# Patient Record
Sex: FEMALE | Race: WHITE | HISPANIC OR LATINO | Employment: UNEMPLOYED | URBAN - METROPOLITAN AREA
[De-identification: names, ages, dates, MRNs, and addresses within clinical notes are randomized per-mention and may not be internally consistent; named-entity substitution may affect disease eponyms.]

---

## 2017-01-25 ENCOUNTER — HOSPITAL ENCOUNTER (EMERGENCY)
Age: 37
Discharge: HOME | End: 2017-01-26
Attending: EMERGENCY MEDICINE
Payer: COMMERCIAL

## 2017-01-25 DIAGNOSIS — R00.0 TACHYCARDIA: Primary | ICD-10-CM

## 2017-01-25 PROCEDURE — 99285 EMERGENCY DEPT VISIT HI MDM: CPT | Performed by: EMERGENCY MEDICINE

## 2017-01-25 RX ORDER — SODIUM CHLORIDE 0.9 % (FLUSH) 0.9 %
5-10 SYRINGE (ML) INJECTION EVERY 8 HOURS
Status: DISCONTINUED | OUTPATIENT
Start: 2017-01-25 | End: 2017-01-26 | Stop reason: HOSPADM

## 2017-01-25 RX ORDER — SODIUM CHLORIDE 0.9 % (FLUSH) 0.9 %
5-10 SYRINGE (ML) INJECTION AS NEEDED
Status: DISCONTINUED | OUTPATIENT
Start: 2017-01-25 | End: 2017-01-26 | Stop reason: HOSPADM

## 2017-01-26 VITALS
RESPIRATION RATE: 18 BRPM | HEART RATE: 102 BPM | SYSTOLIC BLOOD PRESSURE: 129 MMHG | OXYGEN SATURATION: 99 % | DIASTOLIC BLOOD PRESSURE: 73 MMHG | TEMPERATURE: 98.1 F | WEIGHT: 116.84 LBS

## 2017-01-26 LAB
ANION GAP BLD CALC-SCNC: 7 MMOL/L (ref 7–16)
ATRIAL RATE: 100 BPM
BASOPHILS # BLD AUTO: 0 K/UL (ref 0–0.2)
BASOPHILS # BLD: 0 % (ref 0–2)
BUN SERPL-MCNC: 10 MG/DL (ref 6–23)
CALCIUM SERPL-MCNC: 8.3 MG/DL (ref 8.3–10.4)
CALCULATED P AXIS, ECG09: 76 DEGREES
CALCULATED R AXIS, ECG10: 67 DEGREES
CALCULATED T AXIS, ECG11: 68 DEGREES
CHLORIDE SERPL-SCNC: 105 MMOL/L (ref 98–107)
CO2 SERPL-SCNC: 27 MMOL/L (ref 21–32)
CREAT SERPL-MCNC: 0.65 MG/DL (ref 0.6–1)
DIAGNOSIS, 93000: NORMAL
DIASTOLIC BP, ECG02: NORMAL MMHG
DIFFERENTIAL METHOD BLD: NORMAL
EOSINOPHIL # BLD: 0.1 K/UL (ref 0–0.8)
EOSINOPHIL NFR BLD: 2 % (ref 0.5–7.8)
ERYTHROCYTE [DISTWIDTH] IN BLOOD BY AUTOMATED COUNT: 12.7 % (ref 11.9–14.6)
GLUCOSE SERPL-MCNC: 118 MG/DL (ref 65–100)
HCG UR QL: NEGATIVE
HCT VFR BLD AUTO: 39.3 % (ref 35.8–46.3)
HGB BLD-MCNC: 13.3 G/DL (ref 11.7–15.4)
IMM GRANULOCYTES # BLD: 0 K/UL (ref 0–0.5)
IMM GRANULOCYTES NFR BLD AUTO: 0.2 % (ref 0–5)
LYMPHOCYTES # BLD AUTO: 18 % (ref 13–44)
LYMPHOCYTES # BLD: 1.5 K/UL (ref 0.5–4.6)
MAGNESIUM SERPL-MCNC: 1.9 MG/DL (ref 1.8–2.4)
MCH RBC QN AUTO: 31.5 PG (ref 26.1–32.9)
MCHC RBC AUTO-ENTMCNC: 33.8 G/DL (ref 31.4–35)
MCV RBC AUTO: 93.1 FL (ref 79.6–97.8)
MONOCYTES # BLD: 0.8 K/UL (ref 0.1–1.3)
MONOCYTES NFR BLD AUTO: 9 % (ref 4–12)
NEUTS SEG # BLD: 5.6 K/UL (ref 1.7–8.2)
NEUTS SEG NFR BLD AUTO: 71 % (ref 43–78)
P-R INTERVAL, ECG05: 168 MS
PLATELET # BLD AUTO: 211 K/UL (ref 150–450)
PMV BLD AUTO: 11.3 FL (ref 10.8–14.1)
POTASSIUM SERPL-SCNC: 3.5 MMOL/L (ref 3.5–5.1)
Q-T INTERVAL, ECG07: 358 MS
QRS DURATION, ECG06: 72 MS
QTC CALCULATION (BEZET), ECG08: 461 MS
RBC # BLD AUTO: 4.22 M/UL (ref 4.05–5.25)
SODIUM SERPL-SCNC: 139 MMOL/L (ref 136–145)
SYSTOLIC BP, ECG01: NORMAL MMHG
TROPONIN I BLD-MCNC: 0 NG/ML (ref 0–0.08)
VENTRICULAR RATE, ECG03: 100 BPM
WBC # BLD AUTO: 8 K/UL (ref 4.3–11.1)

## 2017-01-26 PROCEDURE — 85025 COMPLETE CBC W/AUTO DIFF WBC: CPT | Performed by: EMERGENCY MEDICINE

## 2017-01-26 PROCEDURE — 84484 ASSAY OF TROPONIN QUANT: CPT

## 2017-01-26 PROCEDURE — 80048 BASIC METABOLIC PNL TOTAL CA: CPT | Performed by: EMERGENCY MEDICINE

## 2017-01-26 PROCEDURE — 93005 ELECTROCARDIOGRAM TRACING: CPT | Performed by: EMERGENCY MEDICINE

## 2017-01-26 PROCEDURE — 83735 ASSAY OF MAGNESIUM: CPT | Performed by: EMERGENCY MEDICINE

## 2017-01-26 PROCEDURE — 81025 URINE PREGNANCY TEST: CPT

## 2017-01-26 NOTE — ED PROVIDER NOTES
HPI Comments: 78-year-old lady with a history of palpitations that started earlier this evening. Patient says she was having dinner with a friend when she began to feel her heart race. She says that she went to the bathroom and felt a little nauseated but never actually throughout. She says she then sat down and after about 20 minutes the symptoms went away. Patient says this is the second time this has happened to her the last time was in November 2016. She said at that time she was at home and it was the middle of the day. She denies any alcohol or drug use and says that she only drinks 2 coffees a day. Patient says the seizure saw a cardiologist for these symptoms earlier this month and is due for a stress test the day after tomorrow. With her symptoms she said she had no chest pain and currently she has no shortness of breath, fevers, or vomiting. Elements of this note were made using speech recognition software. As such, errors of speech recognition may occur. Patient is a 43 y.o. female presenting with palpitations. The history is provided by the patient. Palpitations    Pertinent negatives include no diaphoresis, no fever, no chest pain, no abdominal pain, no nausea, no vomiting, no headaches, no dizziness, no weakness, no cough and no shortness of breath. History reviewed. No pertinent past medical history. Past Surgical History:   Procedure Laterality Date    Hx gyn       ectopic         History reviewed. No pertinent family history. Social History     Social History    Marital status:      Spouse name: N/A    Number of children: N/A    Years of education: N/A     Occupational History    Not on file.      Social History Main Topics    Smoking status: Never Smoker    Smokeless tobacco: Not on file    Alcohol use Yes    Drug use: No    Sexual activity: Not on file     Other Topics Concern    Not on file     Social History Narrative    No narrative on file ALLERGIES: Review of patient's allergies indicates no known allergies. Review of Systems   Constitutional: Negative for chills, diaphoresis and fever. HENT: Negative for congestion, rhinorrhea and sore throat. Eyes: Negative for redness and visual disturbance. Respiratory: Negative for cough, chest tightness, shortness of breath and wheezing. Cardiovascular: Positive for palpitations. Negative for chest pain. Gastrointestinal: Negative for abdominal pain, blood in stool, diarrhea, nausea and vomiting. Endocrine: Negative for polydipsia and polyuria. Genitourinary: Negative for dysuria and hematuria. Musculoskeletal: Negative for arthralgias, myalgias and neck stiffness. Skin: Negative for rash. Allergic/Immunologic: Negative for environmental allergies and food allergies. Neurological: Negative for dizziness, weakness and headaches. Hematological: Negative for adenopathy. Does not bruise/bleed easily. Psychiatric/Behavioral: Negative for confusion and sleep disturbance. The patient is not nervous/anxious. Vitals:    01/25/17 2256   BP: 146/79   Pulse: (!) 108   Resp: 16   Temp: 97.8 °F (36.6 °C)   SpO2: 99%   Weight: 53 kg (116 lb 13.5 oz)            Physical Exam   Constitutional: She is oriented to person, place, and time. She appears well-developed and well-nourished. HENT:   Head: Normocephalic and atraumatic. Eyes: Conjunctivae and EOM are normal. Pupils are equal, round, and reactive to light. Neck: Normal range of motion. Cardiovascular: Regular rhythm. Mild tachycardia   Pulmonary/Chest: Effort normal and breath sounds normal. No respiratory distress. She has no wheezes. She has no rales. She exhibits no tenderness. Abdominal: Soft. Bowel sounds are normal. There is no rebound and no guarding. Musculoskeletal: Normal range of motion. She exhibits no edema or tenderness. Lymphadenopathy:     She has no cervical adenopathy.    Neurological: She is alert and oriented to person, place, and time. Skin: Skin is warm and dry. Psychiatric: She has a normal mood and affect. Nursing note and vitals reviewed. MDM  Number of Diagnoses or Management Options  Diagnosis management comments: EKG  Was normal with a heart rate in the 90s. I will check her basically to light. She had a thyroid study done in November so I will not repeat that. We will also check a pregnancy test.    Patient's heart rate has hovered in the 100-110 range. She is due for a cardiac stress test tomorrow at this time I am not going to provide medication for it. She is seeing [place] [facility]. I will plan to discharge her home and have her follow-up with a cardiologist tomorrow.     ED Course       Procedures

## 2017-01-26 NOTE — DISCHARGE INSTRUCTIONS
Taquicardia supraventricular: Instrucciones de cuidado - [ Supraventricular Tachycardia: Care Instructions ]  Instrucciones de cuidado    La taquicardia supraventricular (SVT, por janine siglas en inglés) significa que, de vez en cuando, Sh[place] late a power velocidad anormalmente rápida. Cambios en el sistema eléctrico del corazón causan estos ritmos acelerados. Podría sentir power sensación de aleteo en el pecho (palpitaciones) y tener el pulso acelerado. Cuando el corazón le late rápidamente, usted podría sentirse ansioso y aturdido, tener falta de aire o sentir molestias en el pecho. Es posible que jenkins médico le recete medicamentos para ayudar a desacelerar los latidos del corazón. Jenkins médico también puede sugerir que pruebe maniobras vagales cuando tenga un episodio de SVT. Estas son cosas, abhishek pujar, que podrían ayudar a desacelerar jenkins frecuencia cardíaca. Pujar significa que usted trata de exhalar con los músculos abdominales eric no jose salir aire por la nariz ni por la boca. Jenkins médico puede mostrarle cómo hacer maniobras vagales. Sylvia puede sugerirle que se recueste boca arriba para hacerlas. En algunos casos, se hace un tratamiento de cardioversión o un procedimiento conocido abhishek ablación por catéter para corregir la SVT. El médico podría pedirle que use un pequeño dispositivo electrónico montez 1 o 2 días para monitorizar jenkins corazón. Sylvia dispositivo se llama un monitor Holter. La atención de seguimiento es power parte clave de jenkins tratamiento y seguridad. Asegúrese de hacer y acudir a todas las citas, y llame a jenkins médico si está teniendo problemas. También es power buena idea saber los resultados de janine exámenes y mantener power lista de los medicamentos que malou. ¿Cómo puede cuidarse en el hogar? · [employer] medicamentos exactamente abhishek le fueron recetados. Llame a jenkins médico si taylor estar teniendo un problema con jenkins medicamento.  Recibirá [employer] medicamentos específicos recetados por jenkins médico.  · Si jenkins médico le mostró cómo hacer maniobras vagales, pruébelas cuando tenga un episodio. Estas maniobras incluyen pujar o ponerse power toalla mojada helada sobre la ryan. · Vigílese la afección llevando un diario de los episodios de SVT. Lléveselo a jenkins médico cuando asista a las visitas. ¨ Escriba cómo de rápido o lento le latía el corazón. Para contar la frecuencia cardíaca:  1. Coloque suavemente 2 dedos de la [place] interior de la [facility] de la [facility], debajo del [facility]. 2. Cuente los latidos montez 30 segundos. 3. Luego, duplique el resultado para obtener el número de latidos por minuto. ¨ Escriba si jenkins ritmo cardíaco era regular o irregular. ¨ Escriba los síntomas que tenía. Thedore Space Corinne Duncans a qué hora del día aparecieron los síntomas. ¨ Escriba qué duración [employer]. ¨ Escriba qué estaba haciendo cuando comenzaron los síntomas. ¨ Escriba qué puede marlene ayudado a que los síntomas desaparecieran. · Limite o evite el alcohol y las bebidas que contienen cafeína si le provocan episodios. · No use descongestionantes de venta molina, clotilde herbarios, pastillas para adelgazar ni pastillas estimulantes, los cuales suelen contener estimulantes. · No use drogas ilegales, abhishek la cocaína, el éxtasis o la [place], las cuales pueden acelerarle el ritmo cardíaco.  · No fume. Fumar puede empeorar esta enfermedad. Si necesita ayuda para dejar de fumar, hable con jenkins médico sobre programas y medicamentos para dejar de fumar. Estos pueden aumentar janine probabilidades de dejar el hábito para siempre. · Esté alerta a la aparición de nuevos síntomas o al empeoramiento de los existentes, abhishek falta de aire, palpitaciones lex o cansancio inusual. Si le aparecen nuevos síntomas o si janine síntomas empeoran, llame a jenkins médico.  ¿Cuándo debe pedir ayuda? Llame al 911 en cualquier momento que considere que necesita atención de [place].  Por ejemplo, llame si:  · Se desmayó (perdió el conocimiento). · Tiene síntomas de un ataque al corazón. Estos podrían incluir:  ¨ Dolor o presión en el pecho, o opwer sensación extraña en el pecho. ¨ Sudoración. ¨ Falta de aire. ¨ Náuseas o vómito. ¨ Dolor, presión o power sensación extraña en la espalda, el rita, la mandíbula, la parte superior del abdomen, o en aristides o ambos hombros o brazos. ¨ Aturdimiento o debilidad repentina. ¨ Latidos cardíacos rápidos o irregulares. Cuando llame al 911, es posible que le digan que mastique 1 aspirina para adultos o 2 a 4 aspirinas de dosis baja. Espere power ambulancia. No trate de conducir usted mismo. Llame a jenkins médico ahora mismo o busque atención médica inmediata si:  · Tiene power sensación de aleteo en el pecho (palpitaciones) que no desaparece rápidamente. · Tiene palpitaciones frecuentes. Preste especial atención a los cambios en jenkins alley y asegúrese de comunicarse con jenkins médico si tiene algún problema. ¿Dónde puede encontrar más información en inglés? Jc Morris a http://aminata-cj.info/. Escriba G244 en la búsqueda para aprender más acerca de \"Taquicardia supraventricular: Instrucciones de cuidado - [ Supraventricular Tachycardia: Care Instructions ]. \"  Revisado: 26 vinh, 2016  Versión del contenido: 11.1  © 5358-3749 Healthwise, Incorporated. Las instrucciones de cuidado fueron adaptadas bajo licencia por [employer] (which disclaims liability or warranty for this information). Si usted tiene [place] afección médica o sobre estas instrucciones, siempre pregunte a jenkins profesional de alley. Healthwise, Incorporated niega toda garantía o responsabilidad por jenkins uso de esta información.

## 2017-01-26 NOTE — ED NOTES
I have reviewed discharge instructions with the patient. The patient verbalized understanding. Pt ambulated to lobby unassisted and is accompanied by her friends. Copy of labs sent to give to Dr. Jd Martinez.

## 2017-01-26 NOTE — ED TRIAGE NOTES
Pt reports she was eating dinner at [facility] and began to have palpitations and shortness of breath. Pt reports symptoms resolved. Pt reports history of this a few months ago but wasn't given a definitive diagnosis. Pt reports recent move from [address].       Sherryle Dance, RN

## 2017-01-26 NOTE — ED NOTES
Agree w/ triage note, IV placed and blood wk collected, pt is resting quietly w/ eyes open, NAD, will cont to monitor, friends at [facility].

## 2017-02-09 ENCOUNTER — HOSPITAL ENCOUNTER (OUTPATIENT)
Dept: MRI IMAGING | Age: 37
Discharge: HOME | End: 2017-02-09
Attending: FAMILY MEDICINE
Payer: COMMERCIAL

## 2017-02-09 VITALS — WEIGHT: 114.64 LBS | BODY MASS INDEX: 19.81 KG/M2

## 2017-02-09 DIAGNOSIS — R55 SYNCOPE, UNSPECIFIED SYNCOPE TYPE: ICD-10-CM

## 2017-02-09 PROCEDURE — A9577 INJ MULTIHANCE: HCPCS | Performed by: FAMILY MEDICINE

## 2017-02-09 PROCEDURE — 74011250636 HC RX REV CODE- 250/636: Performed by: FAMILY MEDICINE

## 2017-02-09 PROCEDURE — 70553 MRI BRAIN STEM W/O & W/DYE: CPT

## 2017-02-09 RX ORDER — SODIUM CHLORIDE 0.9 % (FLUSH) 0.9 %
10 SYRINGE (ML) INJECTION
Status: COMPLETED | OUTPATIENT
Start: 2017-02-09 | End: 2017-02-09

## 2017-02-09 RX ADMIN — GADOBENATE DIMEGLUMINE 10 ML: 529 INJECTION, SOLUTION INTRAVENOUS at 19:37

## 2017-02-09 RX ADMIN — Medication 10 ML: at 19:38

## 2017-02-10 NOTE — PROGRESS NOTES
Please let patient know her MRI Brain showed No evidence of acute intracranial abnormality.     Thanks,  Dr. Martir Degroot

## 2018-03-02 PROBLEM — R80.9 MICROALBUMINURIA: Status: ACTIVE | Noted: 2018-03-02

## 2019-01-21 ENCOUNTER — HOSPITAL ENCOUNTER (OUTPATIENT)
Dept: MAMMOGRAPHY | Age: 39
Discharge: HOME | End: 2019-01-21
Attending: FAMILY MEDICINE
Payer: COMMERCIAL

## 2019-01-21 DIAGNOSIS — Z12.39 BREAST CANCER SCREENING: ICD-10-CM

## 2019-01-21 PROCEDURE — 77067 SCR MAMMO BI INCL CAD: CPT

## 2019-01-22 NOTE — PROGRESS NOTES
Dear Ms. [facility]    Radiologist is recommending additional imaging evaluation ( breast ultrasound)    [facility] will get in touch with her to schedule it.     Thanks,  Dr. Werner Conway

## 2019-01-24 ENCOUNTER — HOSPITAL ENCOUNTER (OUTPATIENT)
Dept: MAMMOGRAPHY | Age: 39
Discharge: HOME | End: 2019-01-24
Attending: FAMILY MEDICINE
Payer: COMMERCIAL

## 2019-01-24 DIAGNOSIS — R92.8 ABNORMAL SCREENING MAMMOGRAM: ICD-10-CM

## 2019-01-24 PROCEDURE — 76882 US LMTD JT/FCL EVL NVASC XTR: CPT

## 2019-01-24 NOTE — PROGRESS NOTES
Dear Ms. Yuli Wakefield    Your recent R. Breast axillary ultrasound is normal. IMPRESSION: Morphologically normal-appearing lymph node within the right axilla.     Thanks,  Dr. Linda Murrieta

## 2020-01-23 ENCOUNTER — HOSPITAL ENCOUNTER (OUTPATIENT)
Dept: MAMMOGRAPHY | Age: 40
Discharge: HOME | End: 2020-01-23
Attending: FAMILY MEDICINE
Payer: COMMERCIAL

## 2020-01-23 DIAGNOSIS — Z12.39 BREAST CANCER SCREENING: ICD-10-CM

## 2020-01-23 PROCEDURE — 77067 SCR MAMMO BI INCL CAD: CPT

## 2020-01-24 NOTE — PROGRESS NOTES
Dear Mrs. Evelin Braga     Your recent mammogram showed :No mammographic evidence of malignancy.      Recommend annual mammogram in one year.  A reminder letter will be scheduled.     Thanks,  Dr. Russell Minus